# Patient Record
Sex: FEMALE | Race: WHITE
[De-identification: names, ages, dates, MRNs, and addresses within clinical notes are randomized per-mention and may not be internally consistent; named-entity substitution may affect disease eponyms.]

---

## 2020-09-26 ENCOUNTER — HOSPITAL ENCOUNTER (EMERGENCY)
Dept: HOSPITAL 72 - EMR | Age: 40
Discharge: HOME | End: 2020-09-26
Payer: COMMERCIAL

## 2020-09-26 VITALS — SYSTOLIC BLOOD PRESSURE: 133 MMHG | DIASTOLIC BLOOD PRESSURE: 78 MMHG

## 2020-09-26 VITALS — SYSTOLIC BLOOD PRESSURE: 157 MMHG | DIASTOLIC BLOOD PRESSURE: 102 MMHG

## 2020-09-26 VITALS — HEIGHT: 67 IN | BODY MASS INDEX: 21.97 KG/M2 | WEIGHT: 140 LBS

## 2020-09-26 VITALS — DIASTOLIC BLOOD PRESSURE: 99 MMHG | SYSTOLIC BLOOD PRESSURE: 137 MMHG

## 2020-09-26 DIAGNOSIS — S00.512A: ICD-10-CM
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
## 2020-09-26 DIAGNOSIS — G40.909: Primary | ICD-10-CM

## 2020-09-26 LAB
ADD MANUAL DIFF: NO
ANION GAP SERPL CALC-SCNC: 16 MMOL/L (ref 5–15)
APPEARANCE UR: (no result)
APTT PPP: (no result) S
BASOPHILS NFR BLD AUTO: 2.3 % (ref 0–2)
BUN SERPL-MCNC: 6 MG/DL (ref 7–18)
CALCIUM SERPL-MCNC: 9.4 MG/DL (ref 8.5–10.1)
CHLORIDE SERPL-SCNC: 97 MMOL/L (ref 98–107)
CO2 SERPL-SCNC: 19 MMOL/L (ref 21–32)
CREAT SERPL-MCNC: 1.1 MG/DL (ref 0.55–1.3)
EOSINOPHIL NFR BLD AUTO: 0.5 % (ref 0–3)
ERYTHROCYTE [DISTWIDTH] IN BLOOD BY AUTOMATED COUNT: 11.8 % (ref 11.6–14.8)
GLUCOSE UR STRIP-MCNC: NEGATIVE MG/DL
HCT VFR BLD CALC: 44 % (ref 37–47)
HGB BLD-MCNC: 14.7 G/DL (ref 12–16)
KETONES UR QL STRIP: (no result)
LEUKOCYTE ESTERASE UR QL STRIP: (no result)
LYMPHOCYTES NFR BLD AUTO: 25.1 % (ref 20–45)
MCV RBC AUTO: 101 FL (ref 80–99)
MONOCYTES NFR BLD AUTO: 13 % (ref 1–10)
NEUTROPHILS NFR BLD AUTO: 59 % (ref 45–75)
NITRITE UR QL STRIP: NEGATIVE
PH UR STRIP: 7 [PH] (ref 4.5–8)
PLATELET # BLD: 226 K/UL (ref 150–450)
POTASSIUM SERPL-SCNC: 4 MMOL/L (ref 3.5–5.1)
PROT UR QL STRIP: (no result)
RBC # BLD AUTO: 4.35 M/UL (ref 4.2–5.4)
SODIUM SERPL-SCNC: 132 MMOL/L (ref 136–145)
SP GR UR STRIP: 1.01 (ref 1–1.03)
UROBILINOGEN UR-MCNC: NORMAL MG/DL (ref 0–1)
WBC # BLD AUTO: 6.8 K/UL (ref 4.8–10.8)

## 2020-09-26 PROCEDURE — 80048 BASIC METABOLIC PNL TOTAL CA: CPT

## 2020-09-26 PROCEDURE — 96375 TX/PRO/DX INJ NEW DRUG ADDON: CPT

## 2020-09-26 PROCEDURE — 70450 CT HEAD/BRAIN W/O DYE: CPT

## 2020-09-26 PROCEDURE — 36415 COLL VENOUS BLD VENIPUNCTURE: CPT

## 2020-09-26 PROCEDURE — 87086 URINE CULTURE/COLONY COUNT: CPT

## 2020-09-26 PROCEDURE — 85025 COMPLETE CBC W/AUTO DIFF WBC: CPT

## 2020-09-26 PROCEDURE — 96374 THER/PROPH/DIAG INJ IV PUSH: CPT

## 2020-09-26 PROCEDURE — 81025 URINE PREGNANCY TEST: CPT

## 2020-09-26 PROCEDURE — 99284 EMERGENCY DEPT VISIT MOD MDM: CPT

## 2020-09-26 PROCEDURE — 96361 HYDRATE IV INFUSION ADD-ON: CPT

## 2020-09-26 PROCEDURE — 81003 URINALYSIS AUTO W/O SCOPE: CPT

## 2020-09-26 NOTE — EMERGENCY ROOM REPORT
History of Present Illness


General


Chief Complaint:  Seizure


Source:  Patient





Present Illness


[facility]


This is a 40-year-old female with no past medical history.  She presents with 

chief complaint of seizure.  Witnessed by her .   said that 

patient was repeating her words and then passed out.  She had a tonic-clonic 

seizure activity lasting for a minute or so.  She did have some oral trauma.  No

incontinence of bowel or urine.  No postictal.  By time EMS got there.  She did 

have a history of seizure about 10 years ago.  Was attributed to dehydration.  

She is not on any medication.  No alcohol drugs.  No other complaint.   back to 

baseline now.


Allergies:  


Coded Allergies:  


     No Known Allergies (Unverified , 9/26/20)





COVID-19 Screening


Contact w/high risk pt:  No


Experienced COVID-19 symptoms?:  No


COVID-19 Testing performed PTA:  No





Patient History


Past Medical History:  see triage record, old chart reviewed


Past Surgical History:  none


Pertinent Family History:  none


Social History:  Denies: smoking


Pregnant Now:  No


Immunizations:  other


Reviewed Nursing Documentation:  PMH: Agreed; PSxH: Agreed





Review of Systems


Eye:  Denies: eye pain, blurred vision


ENT:  Denies: ear pain, nose congestion, throat swelling


Respiratory:  Denies: cough, shortness of breath


Cardiovascular:  Denies: chest pain, palpitations


Gastrointestinal:  Denies: abdominal pain, diarrhea, nausea, vomiting


Musculoskeletal:  Denies: back pain, joint pain


Skin:  Denies: rash


Neurological:  Denies: headache, numbness


Endocrine:  Denies: increased thirst, increased urine


Hematologic/Lymphatic:  Denies: easy bruising


All Other Systems:  negative except mentioned in HPI





Physical Exam





Vital Signs








  Date Time  Temp Pulse Resp B/P (MAP) Pulse Ox O2 Delivery O2 Flow Rate FiO2


 


9/26/20 22:03 98.4 108 14 157/102 (120) 99 Room Air  





Vitals with high blood pressure


Sp02 EP Interpretation:  reviewed, normal


General Appearance:  well appearing, no apparent distress, alert


Head:  normocephalic, atraumatic


Eyes:  bilateral eye PERRL, bilateral eye EOMI


ENT:  hearing grossly normal, normal pharynx, other - Minor abrasion to right 

tip of tongue


Neck:  full range of motion, supple, no meningismus


Respiratory:  chest non-tender, lungs clear, normal breath sounds


Cardiovascular #1:  regular rate, rhythm, no murmur


Gastrointestinal:  normal bowel sounds, non tender, no mass, no organomegaly, no

bruit, non-distended


Musculoskeletal:  back normal, normal range of motion, gait/station normal


Psychiatric:  mood/affect normal





Medical Decision Making


Diagnostic Impression:  


   Primary Impression:  


   Epileptic seizure, generalized


ER Course


Patient presents with tonic-clonic seizure.  No evidence of any meningitis, 

bleed or neoplastic process.  She is back to baseline now.  Her metabolic 

acidosis probably secondary to her seizure.  She does have a mild urinary tract 

infection.  We will put her on antibiotics.  Will discharge home with referral 

to see neurology.  I did do a DMV report because of her seizure.  This will 

restrict her driving.  I explained to this to the patient and she expressed 

understanding.


Rhythm Strip Diag. Results


EP Interpretation:  yes


Rate:  78


Rhythm:  NSR, no PVC's, no ectopy





CT/MRI/US Diagnostic Results


CT/MRI/US Diagnostic Results :  


   Imaging Test Ordered:  CT head


   Impression


Per radiologist negative





Last Vital Signs








  Date Time  Temp Pulse Resp B/P (MAP) Pulse Ox O2 Delivery O2 Flow Rate FiO2


 


9/26/20 22:03 98.4 108 14 157/102 (120) 99 Room Air  








Status:  improved


Disposition:  HOME, SELF-CARE


Condition:  Stable


Scripts


Levetiracetam (KEPPRA) 500 Mg Tablet


500 MG ORAL EVERY 12 HOURS, #60 TAB 0 Refills


   Prov: Nehemias Gaxiola MD         9/26/20


Patient Instructions:  Seizure, Adult





Additional Instructions:  


No driving because of the seizure.  A DMV report was filed.  Follow-up with your

doctor in 7 days.  You will need a referral to see a neurologist for clearance. 

Return if symptoms worsen.











Nehemias Gaxiola MD                  Sep 26, 2020 22:15

## 2020-09-26 NOTE — DIAGNOSTIC IMAGING REPORT
EXAM:

  CT Head Without Intravenous Contrast

 

CLINICAL HISTORY:

  SZ

 

TECHNIQUE:

  Axial computed tomography images of the head/brain without intravenous 

contrast.  CTDI is 53.40 mGy and DLP is 938.70 mGy-cm.  One or more of 

the following dose reduction techniques were used: automated exposure 

control, adjustment of the mA and/or kV according to patient size, use of 

iterative reconstruction technique.

 

COMPARISON:

  None available.

 

FINDINGS:

  Brain:  Unremarkable.  No hemorrhage.  No significant white matter 

disease.  No edema.

  Ventricles:  Unremarkable.  No ventriculomegaly.

  Bones/joints:  Unremarkable.  No acute fracture.

  Soft tissues:  Unremarkable.

  Sinuses:  Unremarkable as visualized.  No acute sinusitis.

  Mastoid air cells:  Unremarkable as visualized.  No mastoid effusion.

 

IMPRESSION:     

No acute intracranial abnormality.

## 2024-04-02 NOTE — NUR
ED Nurse Note:



Report received from KIM Wren.
ED Nurse Note:



pt BIBA PAUL RA 29 from home s/p sz 30 min PTA. per EMS, sz was witnessed by 
pt's  and lasted for about 20 seconds. there is no oral or head trauma, 
pt is c/o nausea. pt reports that she last had a sz 10 years ago but was never 
medicated for it, states she is only taking oral contraceptive, no other meds 
at this time. pt arrived with a 20 gauge IV in the L hand that is patent and 
intact, placed by paramedics. all safety and sz precautions are in place
ED Nurse Note:



pt transported to CT via gurney with IV NS and Keppra running per ERMD orders
ER DISCHARGE NOTE:



Patient is cleared to be discharged per ERMD, pt is aox4, on room air, with 
stable vital signs. pt was given dc and prescription instructions, pt was able 
to verbalize understanding, pt id band and iv site removed without 
complications. pt is able to ambulate with steady gait. pt took all belongings.
HAND-OFF: 

Report given to KIM Melgar
21-Aug-2023